# Patient Record
Sex: FEMALE | Race: WHITE | Employment: FULL TIME | URBAN - METROPOLITAN AREA
[De-identification: names, ages, dates, MRNs, and addresses within clinical notes are randomized per-mention and may not be internally consistent; named-entity substitution may affect disease eponyms.]

---

## 2018-09-07 ENCOUNTER — HOSPITAL ENCOUNTER (OUTPATIENT)
Age: 28
Discharge: HOME | End: 2018-09-09
Payer: COMMERCIAL

## 2018-09-07 DIAGNOSIS — E28.2 PCOS (POLYCYSTIC OVARIAN SYNDROME): ICD-10-CM

## 2018-09-07 DIAGNOSIS — Z87.42 HISTORY OF IRREGULAR MENSTRUAL CYCLES: ICD-10-CM

## 2018-09-07 DIAGNOSIS — E34.9 HORMONE IMBALANCE: ICD-10-CM

## 2018-09-07 LAB
T3 FREE: 3.2 PG/ML (ref 2–4.4)
T4 FREE: 1.1 NG/DL (ref 0.93–1.7)
TSH SERPL DL<=0.05 MIU/L-ACNC: 3.84 UIU/ML (ref 0.27–4.2)
VITAMIN D 25-HYDROXY: 36 NG/ML (ref 30–100)

## 2018-09-07 PROCEDURE — 84270 ASSAY OF SEX HORMONE GLOBUL: CPT

## 2018-09-07 PROCEDURE — 82627 DEHYDROEPIANDROSTERONE: CPT

## 2018-09-07 PROCEDURE — 84403 ASSAY OF TOTAL TESTOSTERONE: CPT

## 2018-09-07 PROCEDURE — 84481 FREE ASSAY (FT-3): CPT

## 2018-09-07 PROCEDURE — 82306 VITAMIN D 25 HYDROXY: CPT

## 2018-09-07 PROCEDURE — 83498 ASY HYDROXYPROGESTERONE 17-D: CPT

## 2018-09-07 PROCEDURE — 84439 ASSAY OF FREE THYROXINE: CPT

## 2018-09-07 PROCEDURE — 84443 ASSAY THYROID STIM HORMONE: CPT

## 2018-09-09 LAB — DHEAS (DHEA SULFATE): 178 UG/DL (ref 65–380)

## 2018-09-11 LAB
SEX HORMONE BINDING GLOBULIN: 110 NMOL/L (ref 30–135)
TESTOSTERONE FREE-NONMALE: 4.8 PG/ML (ref 0.8–7.4)
TESTOSTERONE TOTAL: 63 NG/DL (ref 20–70)

## 2018-09-15 LAB — 17-OH PROGESTERONE LCMS: 20.85 NG/DL

## 2018-09-27 ENCOUNTER — HOSPITAL ENCOUNTER (OUTPATIENT)
Age: 28
Discharge: HOME | End: 2018-09-29
Payer: COMMERCIAL

## 2018-09-27 PROCEDURE — 88175 CYTOPATH C/V AUTO FLUID REDO: CPT

## 2019-03-28 ENCOUNTER — HOSPITAL ENCOUNTER (OUTPATIENT)
Age: 29
Discharge: HOME | End: 2019-03-30
Payer: COMMERCIAL

## 2019-03-28 DIAGNOSIS — R30.0 DYSURIA: ICD-10-CM

## 2019-03-28 PROCEDURE — 87088 URINE BACTERIA CULTURE: CPT

## 2019-03-31 LAB — URINE CULTURE, ROUTINE: NORMAL

## 2019-05-02 ENCOUNTER — HOSPITAL ENCOUNTER (OUTPATIENT)
Age: 29
Discharge: HOME | End: 2019-05-04
Payer: COMMERCIAL

## 2019-05-02 DIAGNOSIS — E34.9 HORMONE IMBALANCE: ICD-10-CM

## 2019-05-02 DIAGNOSIS — E28.2 PCOS (POLYCYSTIC OVARIAN SYNDROME): ICD-10-CM

## 2019-05-02 LAB
ALBUMIN SERPL-MCNC: 5 G/DL (ref 3.5–5.2)
ALP BLD-CCNC: 40 U/L (ref 35–104)
ALT SERPL-CCNC: 19 U/L (ref 0–32)
ANION GAP SERPL CALCULATED.3IONS-SCNC: 12 MMOL/L (ref 7–16)
AST SERPL-CCNC: 21 U/L (ref 0–31)
BILIRUB SERPL-MCNC: 0.5 MG/DL (ref 0–1.2)
BUN BLDV-MCNC: 11 MG/DL (ref 6–20)
CALCIUM SERPL-MCNC: 9.7 MG/DL (ref 8.6–10.2)
CHLORIDE BLD-SCNC: 102 MMOL/L (ref 98–107)
CO2: 27 MMOL/L (ref 22–29)
CREAT SERPL-MCNC: 0.7 MG/DL (ref 0.5–1)
ESTRADIOL LEVEL: 31 PG/ML
FOLLICLE STIMULATING HORMONE: 7.1 MIU/ML
GFR AFRICAN AMERICAN: >60
GFR NON-AFRICAN AMERICAN: >60 ML/MIN/1.73
GLUCOSE BLD-MCNC: 86 MG/DL (ref 74–99)
HBA1C MFR BLD: 4.7 % (ref 4–5.6)
LUTEINIZING HORMONE: 7.8 MIU/ML
PARATHYROID HORMONE INTACT: 29 PG/ML (ref 15–65)
POTASSIUM SERPL-SCNC: 4 MMOL/L (ref 3.5–5)
SODIUM BLD-SCNC: 141 MMOL/L (ref 132–146)
TOTAL PROTEIN: 7.4 G/DL (ref 6.4–8.3)
TSH SERPL DL<=0.05 MIU/L-ACNC: 3.89 UIU/ML (ref 0.27–4.2)
VITAMIN D 25-HYDROXY: 32 NG/ML (ref 30–100)

## 2019-05-02 PROCEDURE — 84443 ASSAY THYROID STIM HORMONE: CPT

## 2019-05-02 PROCEDURE — 84144 ASSAY OF PROGESTERONE: CPT

## 2019-05-02 PROCEDURE — 84481 FREE ASSAY (FT-3): CPT

## 2019-05-02 PROCEDURE — 83498 ASY HYDROXYPROGESTERONE 17-D: CPT

## 2019-05-02 PROCEDURE — 82670 ASSAY OF TOTAL ESTRADIOL: CPT

## 2019-05-02 PROCEDURE — 82627 DEHYDROEPIANDROSTERONE: CPT

## 2019-05-02 PROCEDURE — 83002 ASSAY OF GONADOTROPIN (LH): CPT

## 2019-05-02 PROCEDURE — 83525 ASSAY OF INSULIN: CPT

## 2019-05-02 PROCEDURE — 83001 ASSAY OF GONADOTROPIN (FSH): CPT

## 2019-05-02 PROCEDURE — 82306 VITAMIN D 25 HYDROXY: CPT

## 2019-05-02 PROCEDURE — 80053 COMPREHEN METABOLIC PANEL: CPT

## 2019-05-02 PROCEDURE — 83970 ASSAY OF PARATHORMONE: CPT

## 2019-05-02 PROCEDURE — 83036 HEMOGLOBIN GLYCOSYLATED A1C: CPT

## 2019-05-02 PROCEDURE — 84439 ASSAY OF FREE THYROXINE: CPT

## 2019-05-03 LAB
T3 FREE: 3.8 PG/ML (ref 2–4.4)
T4 FREE: 1.28 NG/DL (ref 0.93–1.7)

## 2019-05-09 ENCOUNTER — HOSPITAL ENCOUNTER (OUTPATIENT)
Age: 29
Discharge: HOME | End: 2019-05-11
Payer: COMMERCIAL

## 2019-05-09 DIAGNOSIS — F41.9 ANXIETY: ICD-10-CM

## 2019-05-09 DIAGNOSIS — R00.2 PALPITATIONS: ICD-10-CM

## 2019-05-09 LAB
T3 FREE: 3.2 PG/ML (ref 2–4.4)
T4 FREE: 1.31 NG/DL (ref 0.93–1.7)
TSH SERPL DL<=0.05 MIU/L-ACNC: 1.52 UIU/ML (ref 0.27–4.2)

## 2019-05-09 PROCEDURE — 84481 FREE ASSAY (FT-3): CPT

## 2019-05-09 PROCEDURE — 84443 ASSAY THYROID STIM HORMONE: CPT

## 2019-05-09 PROCEDURE — 86800 THYROGLOBULIN ANTIBODY: CPT

## 2019-05-09 PROCEDURE — 86376 MICROSOMAL ANTIBODY EACH: CPT

## 2019-05-09 PROCEDURE — 84439 ASSAY OF FREE THYROXINE: CPT

## 2019-05-10 LAB — PROGESTERONE LEVEL: 0.55 NG/ML

## 2019-05-11 LAB
THYROGLOBULIN AB: <0.9 IU/ML (ref 0–4)
THYROID PEROXIDASE (TPO) ABS: 1.4 IU/ML (ref 0–9)

## 2019-05-13 LAB
17-OH PROGESTERONE LCMS: 86.48 NG/DL
DHEAS (DHEA SULFATE): 190 UG/DL (ref 65–380)
INSULIN: 4 UIU/ML (ref 3–19)

## 2019-07-15 ENCOUNTER — OFFICE VISIT (OUTPATIENT)
Dept: PRIMARY CARE CLINIC | Age: 29
End: 2019-07-15
Payer: COMMERCIAL

## 2019-07-15 VITALS
BODY MASS INDEX: 22.49 KG/M2 | SYSTOLIC BLOOD PRESSURE: 124 MMHG | HEART RATE: 78 BPM | OXYGEN SATURATION: 99 % | HEIGHT: 65 IN | RESPIRATION RATE: 15 BRPM | DIASTOLIC BLOOD PRESSURE: 72 MMHG | WEIGHT: 135 LBS

## 2019-07-15 DIAGNOSIS — B37.0 ORAL CANDIDIASIS: Primary | ICD-10-CM

## 2019-07-15 PROCEDURE — 99203 OFFICE O/P NEW LOW 30 MIN: CPT | Performed by: FAMILY MEDICINE

## 2019-07-15 RX ORDER — MONTELUKAST SODIUM 10 MG/1
TABLET ORAL
Refills: 1 | COMMUNITY
Start: 2019-06-10 | End: 2019-07-15

## 2019-07-15 RX ORDER — AZELASTINE HCL 205.5 UG/1
SPRAY NASAL
Refills: 1 | COMMUNITY
Start: 2019-05-29 | End: 2019-07-15

## 2019-07-15 RX ORDER — LORATADINE 10 MG/1
10 TABLET ORAL
COMMUNITY
End: 2020-10-30

## 2019-07-15 RX ORDER — SERTRALINE HYDROCHLORIDE 25 MG/1
TABLET, FILM COATED ORAL
COMMUNITY
Start: 2019-06-20 | End: 2021-02-05

## 2019-07-15 NOTE — PROGRESS NOTES
and breath sounds normal.   Abdominal: Soft. She exhibits no distension. There is no tenderness. Lymphadenopathy:     She has no cervical adenopathy. Skin: Skin is warm and dry. She is not diaphoretic. ASSESSMENT/PLAN:  1. Oral candidiasis  Patient counseled on diagnosis and recommended to contact her allergist regarding her medication regimen. Verbal order given to pharmacist for Magic mouth wash 1diphenhydramine:1lidocaine:2Nystatin with 2 teaspoons (5mL nystatin) qid x 7 days swish and spit without refills. Patient recommended to rtc if any further concerns. All questions answered. I have reviewed my findings and recommendations with Ema Ward MD  7/15/2019 3:53 PM      Please note this report has been partially produced using speech recognition software  and may contain errors related to that system including grammar, punctuation and spelling as well as words and phrases that may seem inappropriate. If there are questions or concerns please feel free to contact me to clarify.

## 2020-03-12 ENCOUNTER — HOSPITAL ENCOUNTER (OUTPATIENT)
Age: 30
Discharge: HOME | End: 2020-03-14
Payer: COMMERCIAL

## 2020-03-12 LAB
ALBUMIN SERPL-MCNC: 5 G/DL (ref 3.5–5.2)
ALP BLD-CCNC: 44 U/L (ref 35–104)
ALT SERPL-CCNC: 12 U/L (ref 0–32)
ANION GAP SERPL CALCULATED.3IONS-SCNC: 18 MMOL/L (ref 7–16)
AST SERPL-CCNC: 21 U/L (ref 0–31)
BILIRUB SERPL-MCNC: 0.6 MG/DL (ref 0–1.2)
BUN BLDV-MCNC: 14 MG/DL (ref 6–20)
CALCIUM SERPL-MCNC: 9.9 MG/DL (ref 8.6–10.2)
CHLORIDE BLD-SCNC: 102 MMOL/L (ref 98–107)
CO2: 22 MMOL/L (ref 22–29)
CREAT SERPL-MCNC: 0.7 MG/DL (ref 0.5–1)
GFR AFRICAN AMERICAN: >60
GFR NON-AFRICAN AMERICAN: >60 ML/MIN/1.73
GLUCOSE FASTING: 81 MG/DL (ref 74–99)
HBA1C MFR BLD: 4.7 % (ref 4–5.6)
POTASSIUM SERPL-SCNC: 4.7 MMOL/L (ref 3.5–5)
SODIUM BLD-SCNC: 142 MMOL/L (ref 132–146)
T3 FREE: 3.4 PG/ML (ref 2–4.4)
T4 FREE: 1.2 NG/DL (ref 0.93–1.7)
TOTAL PROTEIN: 8.1 G/DL (ref 6.4–8.3)
TSH SERPL DL<=0.05 MIU/L-ACNC: 2.91 UIU/ML (ref 0.27–4.2)
VITAMIN D 25-HYDROXY: 51 NG/ML (ref 30–100)

## 2020-03-12 PROCEDURE — 84270 ASSAY OF SEX HORMONE GLOBUL: CPT

## 2020-03-12 PROCEDURE — 80053 COMPREHEN METABOLIC PANEL: CPT

## 2020-03-12 PROCEDURE — 84481 FREE ASSAY (FT-3): CPT

## 2020-03-12 PROCEDURE — 84403 ASSAY OF TOTAL TESTOSTERONE: CPT

## 2020-03-12 PROCEDURE — 84443 ASSAY THYROID STIM HORMONE: CPT

## 2020-03-12 PROCEDURE — 82627 DEHYDROEPIANDROSTERONE: CPT

## 2020-03-12 PROCEDURE — 83525 ASSAY OF INSULIN: CPT

## 2020-03-12 PROCEDURE — 82306 VITAMIN D 25 HYDROXY: CPT

## 2020-03-12 PROCEDURE — 84439 ASSAY OF FREE THYROXINE: CPT

## 2020-03-12 PROCEDURE — 83036 HEMOGLOBIN GLYCOSYLATED A1C: CPT

## 2020-03-14 LAB
DHEAS (DHEA SULFATE): 224 UG/DL (ref 65–380)
INSULIN: 7 UIU/ML (ref 3–19)
SEX HORMONE BINDING GLOBULIN: 192 NMOL/L (ref 30–135)
TESTOSTERONE FREE-NONMALE: 2.5 PG/ML (ref 0.8–7.4)
TESTOSTERONE TOTAL: 54 NG/DL (ref 20–70)

## 2020-10-30 ENCOUNTER — HOSPITAL ENCOUNTER (OUTPATIENT)
Age: 30
Discharge: HOME | End: 2020-11-01
Payer: COMMERCIAL

## 2020-10-30 PROCEDURE — 88175 CYTOPATH C/V AUTO FLUID REDO: CPT

## 2020-12-28 DIAGNOSIS — E28.2 PCOS (POLYCYSTIC OVARIAN SYNDROME): ICD-10-CM

## 2020-12-28 DIAGNOSIS — N92.6 IRREGULAR MENSES: ICD-10-CM

## 2020-12-28 LAB
ESTRADIOL LEVEL: 186.8 PG/ML
GONADOTROPIN, CHORIONIC (HCG) QUANT: <0.1 MIU/ML
LUTEINIZING HORMONE: 9.2 MIU/ML
PROLACTIN: 15.14 NG/ML
T4 FREE: 1.06 NG/DL (ref 0.93–1.7)
TSH SERPL DL<=0.05 MIU/L-ACNC: 1.99 UIU/ML (ref 0.27–4.2)

## 2020-12-30 LAB
DHEAS (DHEA SULFATE): 212 UG/DL (ref 65–380)
PROGESTERONE LEVEL: 12.09 NG/ML
SEX HORMONE BINDING GLOBULIN: 125 NMOL/L (ref 30–135)
TESTOSTERONE FREE-NONMALE: 3.1 PG/ML (ref 0.8–7.4)
TESTOSTERONE TOTAL: 45 NG/DL (ref 20–70)

## 2021-01-01 LAB — PREGNENOLONE: 240 NG/DL (ref 15–132)

## 2021-01-04 LAB — 17-OH PROGESTERONE LCMS: 248.96 NG/DL

## 2021-01-06 DIAGNOSIS — N92.6 IRREGULAR MENSES: ICD-10-CM

## 2021-01-06 LAB — GONADOTROPIN, CHORIONIC (HCG) QUANT: 891.5 MIU/ML

## 2021-01-14 DIAGNOSIS — Z34.90 PREGNANCY, UNSPECIFIED GESTATIONAL AGE: ICD-10-CM

## 2021-01-14 LAB — GONADOTROPIN, CHORIONIC (HCG) QUANT: ABNORMAL MIU/ML

## 2021-01-18 DIAGNOSIS — N91.4 SECONDARY OLIGOMENORRHEA: ICD-10-CM

## 2021-01-18 LAB
BACTERIA: ABNORMAL /HPF
BILIRUBIN URINE: NEGATIVE
BLOOD, URINE: ABNORMAL
CLARITY: CLEAR
COLOR: YELLOW
CRYSTALS, UA: ABNORMAL /HPF
EPITHELIAL CELLS, UA: ABNORMAL /HPF
GLUCOSE URINE: NEGATIVE MG/DL
KETONES, URINE: NEGATIVE MG/DL
LEUKOCYTE ESTERASE, URINE: NEGATIVE
NITRITE, URINE: NEGATIVE
PH UA: 6 (ref 5–9)
PROTEIN UA: 100 MG/DL
RBC UA: ABNORMAL /HPF (ref 0–2)
SPECIFIC GRAVITY UA: >=1.03 (ref 1–1.03)
UROBILINOGEN, URINE: 0.2 E.U./DL
WBC UA: ABNORMAL /HPF (ref 0–5)

## 2021-01-20 LAB — URINE CULTURE, ROUTINE: NORMAL

## 2021-01-21 LAB
C. TRACHOMATIS DNA ,URINE: NEGATIVE
N. GONORRHOEAE DNA, URINE: NEGATIVE
SOURCE: NORMAL

## 2021-02-05 DIAGNOSIS — O20.0 THREATENED ABORTION: ICD-10-CM

## 2021-02-05 DIAGNOSIS — Z87.42 HISTORY OF PCOS: ICD-10-CM

## 2021-02-05 DIAGNOSIS — Z34.91 FIRST TRIMESTER PREGNANCY: ICD-10-CM

## 2021-02-05 DIAGNOSIS — N91.4 SECONDARY OLIGOMENORRHEA: ICD-10-CM

## 2021-02-05 LAB
HBA1C MFR BLD: 4.5 % (ref 4–5.6)
HCT VFR BLD CALC: 39.3 % (ref 34–48)
HEMOGLOBIN: 13.5 G/DL (ref 11.5–15.5)
MCH RBC QN AUTO: 32.2 PG (ref 26–35)
MCHC RBC AUTO-ENTMCNC: 34.4 % (ref 32–34.5)
MCV RBC AUTO: 93.8 FL (ref 80–99.9)
PDW BLD-RTO: 12.1 FL (ref 11.5–15)
PLATELET # BLD: 267 E9/L (ref 130–450)
PMV BLD AUTO: 10.5 FL (ref 7–12)
RBC # BLD: 4.19 E12/L (ref 3.5–5.5)
TSH SERPL DL<=0.05 MIU/L-ACNC: 1.39 UIU/ML (ref 0.27–4.2)
WBC # BLD: 8.8 E9/L (ref 4.5–11.5)

## 2021-02-06 LAB
ABO/RH: NORMAL
ANTIBODY SCREEN: NORMAL

## 2021-02-08 LAB
HEPATITIS B SURFACE ANTIGEN INTERPRETATION: NORMAL
HEPATITIS B SURFACE ANTIGEN INTERPRETATION: NORMAL
HEPATITIS C ANTIBODY INTERPRETATION: NORMAL
HEPATITIS C ANTIBODY INTERPRETATION: NORMAL
HIV-1 AND HIV-2 ANTIBODIES: NORMAL
HIV-1 AND HIV-2 ANTIBODIES: NORMAL
RPR: NORMAL
RUBELLA ANTIBODY IGG: NORMAL

## 2021-02-09 LAB — VARICELLA-ZOSTER VIRUS AB, IGG: NORMAL

## 2021-02-10 PROBLEM — O21.9 NAUSEA AND VOMITING DURING PREGNANCY PRIOR TO 22 WEEKS GESTATION: Status: ACTIVE | Noted: 2021-02-10

## 2021-03-31 PROBLEM — F41.9 ANXIETY: Status: ACTIVE | Noted: 2021-03-31

## 2021-03-31 PROBLEM — Z87.42 HISTORY OF PCOS: Status: ACTIVE | Noted: 2021-03-31

## 2021-04-23 PROBLEM — M62.89 PELVIC FLOOR DYSFUNCTION: Status: ACTIVE | Noted: 2021-04-23
